# Patient Record
Sex: FEMALE | Race: WHITE | URBAN - METROPOLITAN AREA
[De-identification: names, ages, dates, MRNs, and addresses within clinical notes are randomized per-mention and may not be internally consistent; named-entity substitution may affect disease eponyms.]

---

## 2018-09-19 ENCOUNTER — OFFICE VISIT CONVERTED (OUTPATIENT)
Dept: PULMONOLOGY | Facility: CLINIC | Age: 63
End: 2018-09-19
Attending: PHYSICIAN ASSISTANT

## 2019-11-06 ENCOUNTER — OFFICE VISIT CONVERTED (OUTPATIENT)
Dept: FAMILY MEDICINE CLINIC | Facility: CLINIC | Age: 64
End: 2019-11-06
Attending: NURSE PRACTITIONER

## 2019-11-06 ENCOUNTER — CONVERSION ENCOUNTER (OUTPATIENT)
Dept: FAMILY MEDICINE CLINIC | Facility: CLINIC | Age: 64
End: 2019-11-06

## 2019-11-07 ENCOUNTER — HOSPITAL ENCOUNTER (OUTPATIENT)
Dept: FAMILY MEDICINE CLINIC | Facility: CLINIC | Age: 64
Discharge: HOME | End: 2019-11-07
Attending: NURSE PRACTITIONER

## 2019-11-07 LAB
ALBUMIN SERPL-MCNC: 4.6 G/DL (ref 3.5–5)
ALBUMIN/GLOB SERPL: 2 {RATIO} (ref 1.4–2.6)
ALP SERPL-CCNC: 67 U/L (ref 43–160)
ALT SERPL-CCNC: 22 U/L (ref 10–40)
ANION GAP SERPL CALC-SCNC: 19 MMOL/L (ref 8–19)
AST SERPL-CCNC: 39 U/L (ref 15–50)
BILIRUB SERPL-MCNC: 0.32 MG/DL (ref 0.2–1.3)
BUN SERPL-MCNC: 13 MG/DL (ref 5–25)
BUN/CREAT SERPL: 16 {RATIO} (ref 6–20)
CALCIUM SERPL-MCNC: 9.9 MG/DL (ref 8.7–10.4)
CHLORIDE SERPL-SCNC: 105 MMOL/L (ref 99–111)
CHOLEST SERPL-MCNC: 185 MG/DL (ref 107–200)
CHOLEST/HDLC SERPL: 2.9 {RATIO} (ref 3–6)
CONV CO2: 26 MMOL/L (ref 22–32)
CONV TOTAL PROTEIN: 6.9 G/DL (ref 6.3–8.2)
CREAT UR-MCNC: 0.79 MG/DL (ref 0.5–0.9)
GFR SERPLBLD BASED ON 1.73 SQ M-ARVRAT: >60 ML/MIN/{1.73_M2}
GLOBULIN UR ELPH-MCNC: 2.3 G/DL (ref 2–3.5)
GLUCOSE SERPL-MCNC: 96 MG/DL (ref 65–99)
HDLC SERPL-MCNC: 63 MG/DL (ref 40–60)
LDLC SERPL CALC-MCNC: 97 MG/DL (ref 70–100)
OSMOLALITY SERPL CALC.SUM OF ELEC: 300 MOSM/KG (ref 273–304)
POTASSIUM SERPL-SCNC: 4.9 MMOL/L (ref 3.5–5.3)
SODIUM SERPL-SCNC: 145 MMOL/L (ref 135–147)
TRIGL SERPL-MCNC: 127 MG/DL (ref 40–150)
TSH SERPL-ACNC: 1.88 M[IU]/L (ref 0.27–4.2)
VLDLC SERPL-MCNC: 25 MG/DL (ref 5–37)

## 2019-12-11 ENCOUNTER — OFFICE VISIT CONVERTED (OUTPATIENT)
Dept: FAMILY MEDICINE CLINIC | Facility: CLINIC | Age: 64
End: 2019-12-11
Attending: NURSE PRACTITIONER

## 2021-05-15 VITALS
BODY MASS INDEX: 20.49 KG/M2 | OXYGEN SATURATION: 90 % | RESPIRATION RATE: 14 BRPM | SYSTOLIC BLOOD PRESSURE: 136 MMHG | WEIGHT: 120 LBS | HEART RATE: 73 BPM | TEMPERATURE: 97 F | DIASTOLIC BLOOD PRESSURE: 78 MMHG | HEIGHT: 64 IN

## 2021-05-15 VITALS
OXYGEN SATURATION: 94 % | HEART RATE: 75 BPM | BODY MASS INDEX: 21.21 KG/M2 | SYSTOLIC BLOOD PRESSURE: 140 MMHG | TEMPERATURE: 99 F | HEIGHT: 64 IN | DIASTOLIC BLOOD PRESSURE: 88 MMHG | WEIGHT: 124.25 LBS

## 2021-05-28 VITALS
HEIGHT: 64 IN | SYSTOLIC BLOOD PRESSURE: 130 MMHG | WEIGHT: 107.12 LBS | BODY MASS INDEX: 18.29 KG/M2 | TEMPERATURE: 98.7 F | RESPIRATION RATE: 14 BRPM | HEART RATE: 76 BPM | OXYGEN SATURATION: 93 % | DIASTOLIC BLOOD PRESSURE: 80 MMHG

## 2021-05-28 NOTE — PROGRESS NOTES
Patient: NÉSTOR ATKINS     Acct: [id number]     Report: #[id number]  UNIT #: [id number]     : 1955    Encounter Date:2018  PRIMARY CARE:   ***Signed***  --------------------------------------------------------------------------------------------------------------------  Chief Complaint      Encounter Date      Sep 19, 2018            Primary Care Provider            sarita owens md            Referring Provider            sarita owens md            Patient Complaint      Patient is complaining of      f/u fro h 18to  18, bilateral pneumonia            VITALS      Height 5 ft 4 in / 162.56 cm      Weight 107 lbs 2 oz / 48.065434 kg      BSA 1.47 m2      BMI 18.4 kg/m2      Temperature 98.7 F / 37.06 C - Oral      Pulse 76      Respirations 14      Blood Pressure 130/80 Sitting, Left Arm      Pulse Oximetry 93%, room air            HPI      The patient is a 63 year old  female who was recently hospitalized at     [facility] and seen by Dr. Parrish and Dr. Butcher. She was initially    admitted 18 with coughing, wheezing, dyspnea and chest pain. She was found    to be significantly hypoxic and hypercapnic with a CO2 of 99 on arterial blood     gases bu the patient refused intubation and actually ended up leaving the     hospital against medical advice. She went back home with her   and     shortly after arriving home the patient collapsed and was unresponsive. She was     brought back to the emergency room and was intubated by the emergency room     physician. She was started on IV steroids and IV antibiotics and found to have     community acquired pneumonia based on chest CT scan. She was eventually able to     be extubated and continued on treatment with antibiotics, steroids and breathing    treatments. She was noted to also have macrocytosis, thrombocytopenia and     transaminitis. The patient was discharged from the hospital with a tapered     course of  Prednisone and Augmentin for 3 more days to complete her course of     antibiotics. She was discharged on Anoro but this ended up being changed to     Advair because Leslie Mcfadden did not carry Anoro. She was on Advair discus long     standing. She has an albuterol rescue inhaler and although the patient appears     anxious today and is a poor historian, her  reports she has not needed     her rescue inhaler and seems to be doing well on Advair. She denies any     increased dyspnea on exertion, cough productive of purulent sputum, wheezing,     hemoptysis, fevers or chills. Her main compliant is that she has had memory     issues since her hospitalization. She and her  feel that her short term     memory has drastically diminished and they are concerned about this. She     continues to smoke half a pack per day and says she does not desire to quit     smoking.             I have reviewed his Review of Systems medical, surgical and family history and     agree with those as entered.            ROS      Constitutional:  Complains of: Fatigue; Denies: Fever, Weight gain, Weight loss,    Chills, Insomnia, Other      Respiratory/Breathing:  Denies: Shortness of air, Wheezing, Cough, Hemoptysis,     Pleuritic pain, Other      Endocrine:  Denies: Polydipsia, Polyuria, Heat/cold intolerance, Abnorml     menstrual pattern, Diabetes, Other      Eyes:  Denies: Blurred vision, Vision Changes, Other      Ears, nose, mouth, throat:  Denies: Mouth lesions, Thrush, Throat pain,     Hoarseness, Allergies/Hay Fever, Post Nasal Drip, Headaches, Recent Head Injury,    Nose Bleeding, Neck Stiffness, Thyroid Mass, Hearing Loss, Ear Fullness, Dry     Mouth, Nasal or Sinus Pain, Dry Lips, Nasal discharge, Nasal congestion, Other      Cardiovascular:  Denies: Palpitations, Syncope, Claudication, Chest Pain, Wake     up Gasping for air, Leg Swelling, Irregular Heart Rate, Cyanosis, Dyspnea on     Exertion, Other       Gastrointestinal:  Denies: Nausea, Constipation, Diarrhea, Abdominal pain,     Vomiting, Difficulty Swallowing, Reflux/Heartburn, Dysphagia, Jaundice,     Bloating, Melena, Bloody stools, Other      Genitourinary:  Denies: Urinary frequency, Incontinence, Hematuria, Urgency,     Nocturia, Dysuria, Testicular problems, Other      Musculoskeletal:  Denies: Joint Pain, Joint Stiffness, Joint Swelling, Myalgias,    Other      Hematologic/lymphatic:  DENIES: Lymphadenopathy, Bruising, Bleeding tendencies,     Other      Neurological:  Complains of: Other (memory loss); Denies: Headache, Numbness,     Weakness, Seizures      Psychiatric:  Denies: Anxiety, Appropriate Effect, Depression, Other      Sleep:  No: Excessive daytime sleep, Morning Headache?, Snoring, Insomnia?, Stop    breathing at sleep?, Other      Integumentary:  Denies: Rash, Dry skin, Skin Warm to Touch, Other      Immunologic/Allergic:  Denies: Latex allergy, Seasonal allergies, Asthma,     Urticaria, Eczema, Other      Immunization status:  No: Up to date            FAMILY/SOCIAL/MEDICAL HX      Surgical History:  Yes: CABG (2000), Other Surgeries (anurisim , blockage     removal righ to left )      Heart - Family Hx:  Mother      Is Father Still Living?:  No      Is Mother Still Living?:  No      Social History:  Tobacco Use      Smoking status:  Current every day smoker (1/2 to 1 ppd for 30 years )      Anticoagulation Therapy:  No      Antibiotic Prophylaxis:  Yes      Medical History:  Yes: Asthma, Chronic Bronchitis/COPD, Seizures (HAD SEIZURES     AFTER BRAIN SX OVER 15YRS AGO), Heart Attack (2 HEART ATTACKS), High Blood     Pressure, Shortness Of Breath; No: Blood Disease, Chemotherapy/Cancer, Deafness     or Ringing Ears, Hemorrhoids/Rectal Prob, Miscellaneous Medical/oth      Psychiatric History      none            PREVENTION      Hx Influenza Vaccination:  Yes      Date Influenza Vaccine Given:  Sep 1, 2017      2 or More Falls Past Year?:   No      Fall Past Year with Injury?:  No      Hx Pneumococcal Vaccination:  Yes (2016)      Encouraged to follow-up with:  PCP regarding preventative exams.      Chart initiated by      Xenia Freire ma            ALLERGIES/MEDICATIONS      Allergies:        Coded Allergies:             ATENOLOL (Verified  Allergy, Severe, ANAPHALAXIS, 9/19/18)           HYDROMORPHONE (Verified  Allergy, Severe, ANAPHALAXIS, 9/19/18)           MEPERIDINE (Verified  Allergy, Severe, ANAPHALAXIS, 9/19/18)           MIRTAZAPINE (Verified  Allergy, Severe, ANAPHALAXIS, 9/19/18)           POLYMYXIN B (Verified  Allergy, Severe, ANAPHALAXIS, 9/19/18)           TUBERCULIN, PURIFIED PROTEIN DERIVA (Verified  Allergy, Severe, 9/19/18)           CHOCOLATE FLAVOR (Verified  Allergy, Intermediate, SWELLING, 9/19/18)                  THROAT SWELLS           CARVEDILOL (Verified  Allergy, Unknown, 9/19/18)                  Hypotensive, bradycardic           GABAPENTIN (Verified  Adverse Reaction, Unknown, 9/19/18)                  Agitation, insomnia      Medications    Last Reconciled on 9/19/18 14:01 by BERLIN GR      Albuterol/Ipratropium (Duoneb) 3 Ml Ampul.neb      3 ML INH Q4H PRN for SHORTNESS OF BREATH, #120 NEB 6 Refills         Prov: Rylee Sandoval PA-C         9/19/18       predniSONE* (predniSONE*) 20 Mg Tablet      20 MG PO ASDIR, #20 TAB 0 Refills         Prov: Leonides Jeffries         9/1/18       MDI-Albuterol (Ventolin HFA*) 8 Gm Hfa.aer.ad      2 PUFFS INH Q4-6H PRN for DYSPNEA, #1 INH 6 Refills         Prov: Francisco Butcher         8/31/18       Umeclidinium/Vilanterol 62.5-25 Mcg Inh (Anoro Ellipta 62.5-25 Mcg Inh) 1 Each     Blst.w.dev      1 PUFF INH QDAY, #1 INH 9 Refills         Prov: Francisco Butcher         8/31/18       Fexofenadine Hcl (Fexofenadine Hcl) 180 Mg Tablet      180 MG PO QDAY, #30 TAB 0 Refills         Reported         8/28/18       Nitroglycerin (Nitrostat*) 0.4 Mg Tablet      0.4 MG SL Q5MIN PRN for CHEST  PAIN MDD DO NOT EXCEED 3 TABS         Reported         8/28/18       Sodium Chloride (Ocean Nasal Spray) 104 Ml Spray      1 SPRAYS NARE EACH QDAY PRN for DRY NOSE, BOTTLE         Reported         8/28/18       Calcium Carbonate/Vitamin D3 (Calcium 500+D Tablet) 1 Each Tablet      1 EACH PO BID, TABLET         Reported         8/28/18       Valacyclovir HCl (Valacyclovir) 500 Mg Tablet      500 MG PO QDAY, TAB         Reported         8/28/18       Ranitidine Hcl (Ranitidine*) 150 Mg Tablet      150 MG PO BID, #60 TAB 0 Refills         Reported         8/28/18       Propranolol (Inderal) 10 Mg Tablet      10 MG PO QDAY, TAB         Reported         8/28/18       Olopatadine (Patanol 0.1% Ophth) 5 Ml Drops      1 DROPS EYE EACH QDAY, BOTTLE         Reported         8/28/18       Montelukast Sodium (Singulair*) 10 Mg Tablet      10 MG PO QDAY, #30 TAB 0 Refills         Reported         8/28/18       Polo-Fluticasone (Fluticasone 50 mcg) 16 Gm Spray.susp      2 PUFFS NARE EACH QDAY PRN for ALLERGIES, #1 BOTTLE 0 Refills         Reported         8/28/18       Aspirin EC (Aspirin EC*) 81 Mg Tablet.dr      81 MG PO QDAY MDD ALTERNATE WITH FIORINAL, #30 TAB.EC 0 Refills         Reported         8/28/18       Atorvastatin (Atorvastatin) 20 Mg Tablet      20 MG PO HS, #30 TAB 0 Refills         Reported         8/28/18       Olopatadine HCl (Pataday 0.2% Ophth) 2.5 Ml Bottle      1 DROPS EYE EACH QDAY, BOTTLE         Reported         8/11/14      Current Medications      Current Medications Reviewed 9/19/18            EXAM      Vital signs reviewed.      HEENT: Pupils are equally round and reactive to light and accommodation.      Extraocular muscles intact bilateral. Nares patent without hypertrophy of the     turbinates.  TM's are clear bilaterally. Small oropharynx without lesions or     erythema.       NECK:  Supple without tracheal deviation or lymphadenopathy. No thyromegaly     appreciated.       LYMPHATICS: No cervical  or supraclavicular lymphadenopathy.       RESPIRATORY: Mildly decreased breath sounds throughout, no wheezes, rales or     rhonchi, tympanic to percussion. Normal work of breathing noted.       CVS:  Regular rate and rhythm, no murmurs, rubs or gallops, no lower extremity     edema, , equal radial pulses.      GI: Abdomen soft, nontender, nondistended, no hepatomegaly appreciated.  Bowel     sounds present in all 4 quadrants.       MUSCULOSKELETAL: No erythema, warmth or fluctuance over the major joints     including the knees, ankles, wrists and elbows.        SKIN: No rashes or lesions.       NEUROLOGICAL: Alert and oriented X 3.  No focal deficits on exam. Cranial nerves    II-XII intact bilaterally.       PSYCH: Patient has appropriate mood and affect.      Vtials      Vitals:             Height 5 ft 4 in / 162.56 cm           Weight 107 lbs 2 oz / 48.102112 kg           BSA 1.47 m2           BMI 18.4 kg/m2           Temperature 98.7 F / 37.06 C - Oral           Pulse 76           Respirations 14           Blood Pressure 130/80 Sitting, Left Arm           Pulse Oximetry 93%, room air            REVIEW      Results Reviewed      PCCS Results Reviewed?:  Yes Prev Lab Results, Yes Prev Radiology Results, Yes     Previous [facility] Records (I personally reviewed the patient's recent pulmonary     consultation, progress notes and discharge summary. )      Radiographic Results               [facility]                PACS RADIOLOGY REPORT            Patient: NÉSTOR ATKINS   Acct: #[id number]   Report: #[id number]            UNIT #: [id number]    DOS: 18 1037      INSURANCE:[employer]   ORDER #:[id number]      LOCATION:[facility]  403   : 1955            PROVIDERS      ADMITTING:  Artur Diamond   ATTENDING: Artur Diamond      FAMILY:  PRINCE CABELLO   ORDERING:  Artur Diamond         OTHER:    DICTATING:  Srinivasa Freire MD            REQ  #:[id number]   EXAM:[facility] - CT CHEST with CONTRAST      REASON FOR EXAM:  hypoxic resp failure      REASON FOR VISIT:  COPD EXACERBATION            *******Signed******         PROCEDURE:   CT CHEST W/ CONTRAST             COMPARISON:   [facility], CR, CHEST AP/PA 1 VIEW, 8/26/2018,     19:57.             INDICATIONS:   hypoxic resp failure/ COPD EXACERBATION             TECHNIQUE:   CT images were obtained with non-ionic intravenous contrast materia    l.               PROTOCOL:     Pulmonary embolism imaging protocol performed                RADIATION:     DLP: 522mGy*cm          Automated exposure control was utilized to minimize radiation dose.       CONTRAST:   100cc Isovue 370 I.V.      LABS:     eGFR: >60ml/min/1.73m2             FINDINGS:         Lung window images reveal moderate centrilobular emphysema.             The pulmonary interstitium in the peripheral mid and lower lungs is abnormally     prominent.        Scattered ground-glass opacities are evident.  Findings are consistent with UIP     and may represent       early chronic interstitial lung disease/pulmonary fibrosis.             Mediastinal windows reveal no mediastinal, hilar, or axillary adenopathy.      Scattered Coronary       artery calcifications are evident.  A stent is seen in the proximal left     subclavian artery.             CONCLUSION:         CT scan of the chest with IV contrast demonstrating moderate centrilobular     emphysema.             The pulmonary interstitium is abnormally prominent in the peripheral mid and     lower lungs.        Scattered ground-glass opacities are evident.              MERLYN ORTIZ MD             Electronically Signed and Approved By: MERLYN ORTIZ MD on 8/27/2018 at 14:48                           Until signed, this is an unconfirmed preliminary report that may contain      errors and is subject to change.                                              COUST:      D:08/27/18 1448             Assessment      COPD with acute exacerbation - J44.1            Community acquired pneumonia - J18.9            Acute respiratory failure with hypoxia and hypercapnia - J96.01, J96.02            SOB (shortness of breath) - R06.02            Notes      New Medications      * Albuterol/Ipratropium (Duoneb) 3 ML AMPUL.NEB: 3 ML INH Q4H PRN SHORTNESS OF       BREATH #120         Instructions: DIAGNOSIS CODE REQUIRED PRIOR TO PRESCRIBING.         Dx: COPD with acute exacerbation - J44.1      Discontinued Medications      * AMOXICILLIN/CLAVULANATE K (Augmentin 875 Mg) 1 EACH TABLET: 875 MG PO BID 3       Days #6      New Diagnostics      * 6 Min Walk With Pulse Ox, 2 Months         Dx: COPD with acute exacerbation - J44.1      * PFT-Comp, PrePost,DLCO,BodyBox, 2 Months         Dx: COPD with acute exacerbation - J44.1      * Chest W/O Cont CT, 2 Months         Dx: COPD with acute exacerbation - J44.1      * Alpha 1 Antitrypsin , Routine         Dx: COPD with acute exacerbation - J44.1      ASSESSMENT:      1.  Chronic obstructive pulmonary disease with acute exacerbation resolving.       2. Community acquired pneumonia from unspecified organism.      3. Ground glass opacities on chest CT scan.       4. Acute hypoxic and hypercapnic respiratory failure requiring intubation and     mechanical ventilation resolving.       5. Toxic metabolic encephalopathy secondary to CO2 narcosis.       6. Ongoing tobacco abuse of cigarettes, does not desire to quit smoking at this     time.       7. Recent history of unexplained weight loss.       8. Macrocytosis.      9. Thrombocytopenia.       10. Transaminitis.             PLAN:       1. I will check alpha-1 antitrypsin level and genetics.       2.  We will repeat a chest CT scan in 2-3 months to follow up for resolution of     her recent pneumonia.       3. We will check pulmonary function tests and six minute walk test.       4. The patient and her  have declined any inhalers  that are not available    at [facility] so she wishes to stay on Advair at this time. Continue PRN     albuterol. I have also prescribed DuoNeb to use as needed. She has a nebulizer     machine at home.       5. I counseled the patient for 5 minutes on smoking cessation and offered     nicotine replacement therapy and she declines at this time.       6. She should qualify for Trilogy machine based on her significant hypercapnia     and recent acute hypoxic and hypercarbic respiratory failure but the patient     adamantly declines and refuses to use any type of mask or machine while she     sleeps. She feels she would not be able to tolerate this at all.       7. I have discussed with the patient that her short term memory loss is likely     secondary to her acute hypoxic and hypercapnic respiratory failure and     particularly to the time when she was unresponsive at home after she refused     intubation and left the hospital against medical advice. I have encouraged her     to be evaluated by a neurologist and offered a referral for that today but she     adamantly declines.        8. Follow up with Dr. Parrish or Dr. Butcher in 2 months after her follow up     testing is complete, sooner if needed.            Patient Education      Tobacco Cessation Counseling:  for 3 - 10 minutes      Patient Education Provided:  COPD, How to use an Inhaler, Smoking Cessation      Time Spent:  > 50% /[facility]            Patient Education:        COPD                 Disclaimer: Converted document may not contain table formatting or lab diagrams. Please see [employer] for the authenticated document.